# Patient Record
Sex: FEMALE | Race: WHITE | NOT HISPANIC OR LATINO | URBAN - METROPOLITAN AREA
[De-identification: names, ages, dates, MRNs, and addresses within clinical notes are randomized per-mention and may not be internally consistent; named-entity substitution may affect disease eponyms.]

---

## 2024-06-21 ENCOUNTER — OCCMED (OUTPATIENT)
Dept: URGENT CARE | Facility: CLINIC | Age: 39
End: 2024-06-21

## 2024-06-21 DIAGNOSIS — Z02.89 ENCOUNTER FOR OCCUPATIONAL PHYSICAL EXAMINATION: Primary | ICD-10-CM

## 2024-08-30 ENCOUNTER — APPOINTMENT (OUTPATIENT)
Dept: LAB | Facility: CLINIC | Age: 39
End: 2024-08-30
Payer: COMMERCIAL

## 2024-08-30 DIAGNOSIS — R53.1 WEAKNESS: ICD-10-CM

## 2024-08-30 DIAGNOSIS — R73.9 HYPERGLYCEMIA: ICD-10-CM

## 2024-08-30 DIAGNOSIS — E07.9 THYROID DISEASE: ICD-10-CM

## 2024-08-30 DIAGNOSIS — E78.5 HYPERLIPIDEMIA, UNSPECIFIED HYPERLIPIDEMIA TYPE: ICD-10-CM

## 2024-08-30 LAB
ALBUMIN SERPL BCG-MCNC: 4.6 G/DL (ref 3.5–5)
ALP SERPL-CCNC: 42 U/L (ref 34–104)
ALT SERPL W P-5'-P-CCNC: 10 U/L (ref 7–52)
ANION GAP SERPL CALCULATED.3IONS-SCNC: 6 MMOL/L (ref 4–13)
AST SERPL W P-5'-P-CCNC: 16 U/L (ref 13–39)
BASOPHILS # BLD AUTO: 0.07 THOUSANDS/ÂΜL (ref 0–0.1)
BASOPHILS NFR BLD AUTO: 1 % (ref 0–1)
BILIRUB SERPL-MCNC: 0.69 MG/DL (ref 0.2–1)
BUN SERPL-MCNC: 16 MG/DL (ref 5–25)
CALCIUM SERPL-MCNC: 9.2 MG/DL (ref 8.4–10.2)
CHLORIDE SERPL-SCNC: 104 MMOL/L (ref 96–108)
CHOLEST SERPL-MCNC: 171 MG/DL
CO2 SERPL-SCNC: 27 MMOL/L (ref 21–32)
CREAT SERPL-MCNC: 0.8 MG/DL (ref 0.6–1.3)
EOSINOPHIL # BLD AUTO: 0.06 THOUSAND/ÂΜL (ref 0–0.61)
EOSINOPHIL NFR BLD AUTO: 1 % (ref 0–6)
ERYTHROCYTE [DISTWIDTH] IN BLOOD BY AUTOMATED COUNT: 11.8 % (ref 11.6–15.1)
EST. AVERAGE GLUCOSE BLD GHB EST-MCNC: 103 MG/DL
GFR SERPL CREATININE-BSD FRML MDRD: 94 ML/MIN/1.73SQ M
GLUCOSE P FAST SERPL-MCNC: 88 MG/DL (ref 65–99)
HBA1C MFR BLD: 5.2 %
HCT VFR BLD AUTO: 40.9 % (ref 34.8–46.1)
HDLC SERPL-MCNC: 80 MG/DL
HGB BLD-MCNC: 13.7 G/DL (ref 11.5–15.4)
IMM GRANULOCYTES # BLD AUTO: 0.01 THOUSAND/UL (ref 0–0.2)
IMM GRANULOCYTES NFR BLD AUTO: 0 % (ref 0–2)
LDLC SERPL CALC-MCNC: 79 MG/DL (ref 0–100)
LYMPHOCYTES # BLD AUTO: 1.87 THOUSANDS/ÂΜL (ref 0.6–4.47)
LYMPHOCYTES NFR BLD AUTO: 36 % (ref 14–44)
MCH RBC QN AUTO: 30.3 PG (ref 26.8–34.3)
MCHC RBC AUTO-ENTMCNC: 33.5 G/DL (ref 31.4–37.4)
MCV RBC AUTO: 91 FL (ref 82–98)
MONOCYTES # BLD AUTO: 0.42 THOUSAND/ÂΜL (ref 0.17–1.22)
MONOCYTES NFR BLD AUTO: 8 % (ref 4–12)
NEUTROPHILS # BLD AUTO: 2.71 THOUSANDS/ÂΜL (ref 1.85–7.62)
NEUTS SEG NFR BLD AUTO: 54 % (ref 43–75)
NONHDLC SERPL-MCNC: 91 MG/DL
NRBC BLD AUTO-RTO: 0 /100 WBCS
PLATELET # BLD AUTO: 222 THOUSANDS/UL (ref 149–390)
PMV BLD AUTO: 10.1 FL (ref 8.9–12.7)
POTASSIUM SERPL-SCNC: 4.2 MMOL/L (ref 3.5–5.3)
PROT SERPL-MCNC: 6.9 G/DL (ref 6.4–8.4)
RBC # BLD AUTO: 4.52 MILLION/UL (ref 3.81–5.12)
SODIUM SERPL-SCNC: 137 MMOL/L (ref 135–147)
T4 FREE SERPL-MCNC: 0.77 NG/DL (ref 0.61–1.12)
TRIGL SERPL-MCNC: 58 MG/DL
TSH SERPL DL<=0.05 MIU/L-ACNC: 0.81 UIU/ML (ref 0.45–4.5)
WBC # BLD AUTO: 5.14 THOUSAND/UL (ref 4.31–10.16)

## 2024-08-30 PROCEDURE — 84439 ASSAY OF FREE THYROXINE: CPT

## 2024-08-30 PROCEDURE — 80061 LIPID PANEL: CPT

## 2024-08-30 PROCEDURE — 84443 ASSAY THYROID STIM HORMONE: CPT

## 2024-08-30 PROCEDURE — 85025 COMPLETE CBC W/AUTO DIFF WBC: CPT

## 2024-08-30 PROCEDURE — 83036 HEMOGLOBIN GLYCOSYLATED A1C: CPT

## 2024-08-30 PROCEDURE — 80053 COMPREHEN METABOLIC PANEL: CPT

## 2024-08-30 PROCEDURE — 36415 COLL VENOUS BLD VENIPUNCTURE: CPT

## 2025-02-17 ENCOUNTER — OFFICE VISIT (OUTPATIENT)
Dept: URGENT CARE | Facility: CLINIC | Age: 40
End: 2025-02-17
Payer: COMMERCIAL

## 2025-02-17 VITALS
SYSTOLIC BLOOD PRESSURE: 134 MMHG | WEIGHT: 139 LBS | RESPIRATION RATE: 18 BRPM | OXYGEN SATURATION: 97 % | DIASTOLIC BLOOD PRESSURE: 74 MMHG | HEART RATE: 114 BPM | TEMPERATURE: 101.5 F

## 2025-02-17 DIAGNOSIS — R50.9 FEVER, UNSPECIFIED FEVER CAUSE: ICD-10-CM

## 2025-02-17 DIAGNOSIS — J06.9 UPPER RESPIRATORY TRACT INFECTION, UNSPECIFIED TYPE: Primary | ICD-10-CM

## 2025-02-17 PROBLEM — K21.9 GASTROESOPHAGEAL REFLUX DISEASE: Status: ACTIVE | Noted: 2023-07-15

## 2025-02-17 PROBLEM — R51.9 NONINTRACTABLE HEADACHE: Status: ACTIVE | Noted: 2023-07-15

## 2025-02-17 PROBLEM — Z30.41 ORAL CONTRACEPTIVE USE: Status: ACTIVE | Noted: 2023-07-15

## 2025-02-17 PROBLEM — F41.1 GENERALIZED ANXIETY DISORDER: Status: ACTIVE | Noted: 2023-07-15

## 2025-02-17 PROBLEM — K14.3 COATED TONGUE: Status: ACTIVE | Noted: 2023-07-15

## 2025-02-17 PROCEDURE — 99213 OFFICE O/P EST LOW 20 MIN: CPT

## 2025-02-17 PROCEDURE — 87636 SARSCOV2 & INF A&B AMP PRB: CPT

## 2025-02-17 RX ORDER — IBUPROFEN 800 MG/1
800 TABLET, FILM COATED ORAL 3 TIMES DAILY
COMMUNITY
Start: 2024-12-08

## 2025-02-17 RX ORDER — ESCITALOPRAM OXALATE 10 MG/1
10 TABLET ORAL DAILY
COMMUNITY
Start: 2024-11-26

## 2025-02-17 RX ORDER — BENZONATATE 100 MG/1
100 CAPSULE ORAL 3 TIMES DAILY PRN
Qty: 20 CAPSULE | Refills: 0 | Status: SHIPPED | OUTPATIENT
Start: 2025-02-17

## 2025-02-17 RX ORDER — ACETAMINOPHEN 325 MG/1
650 TABLET ORAL ONCE
Status: COMPLETED | OUTPATIENT
Start: 2025-02-17 | End: 2025-02-17

## 2025-02-17 RX ORDER — ALBUTEROL SULFATE 90 UG/1
2 INHALANT RESPIRATORY (INHALATION) EVERY 6 HOURS PRN
Qty: 8.5 G | Refills: 0 | Status: SHIPPED | OUTPATIENT
Start: 2025-02-17

## 2025-02-17 RX ADMIN — ACETAMINOPHEN 650 MG: 325 TABLET ORAL at 16:21

## 2025-02-17 NOTE — PROGRESS NOTES
[facility]        NAME: Tory Romero is a 39 y.o. female  : 1985    MRN: [id number]  DATE: 2025  TIME: 4:23 PM    Assessment and Plan   Upper respiratory tract infection, unspecified type [J06.9]  1. Upper respiratory tract infection, unspecified type  benzonatate (TESSALON PERLES) 100 mg capsule    albuterol (ProAir HFA) 90 mcg/act inhaler      2. Fever, unspecified fever cause  acetaminophen (TYLENOL) tablet 650 mg        One-time dose of tylenol given in clinic for fever. Tessalon perles and albuterol as directed for additional symptom relief. COVID/flu PCR sent to lab, will f/u if positive. Suspect viral illness given clinical presentation. Patient out of window for Tamiflu. VSS in clinic, appears in no acute distress. Educated on use of OTC products for additional relief of symptoms. Advised close follow-up with PCP or to report to the ER if symptoms worsen. Patient verbalizes understanding and agreeable to plan.  Work note provided.      Patient Instructions     Symptoms of a viral infection may linger for up to 10 days. Your contagious period is the first 5-7 days of symptom onset. Take tessalon perles and use inhaler as directed as needed. Continue over-the-counter products for other symptoms: tylenol for fevers, ibuprofen for body aches, sudafed and flonase (fluticasone) with nasal saline for congestion, mucinex for cough, and airborne/emergen-c for vitamin supplementation. Follow-up with PCP in 3-5 days if no improvement of symptoms. Report to ER sooner if symptoms worsen.       Chief Complaint     Chief Complaint   Patient presents with    Cough     3 days of coughing, dry cough. Patient brings out a lot mucus when blowing her nose. She has been experiencing post nasal drip as well, which is beginning to irritate her throat. Past few days doing OTC medication, such as Day Quil, Ny Quil, also some tumeric pills. Patient goes into coughing spells and end up feeling SOB.           History of Present Illness       39 year old female presents for evaluation of cough, congestion, fever, and fatigue x 2-3 days. She denies any known sick contacts or triggers. She denies h/o asthma but reports chest tightness with deep breathing and coughing. She is not UTD on COVID or flu vaccines for this season. She denies productive sputum. She reports the cough is worse at night when lying down. She denies sore throat or ear pain. She has tried dayquil and nyquil with minimal symptom relief.    Cough  This is a new problem. The current episode started in the past 7 days. The problem has been unchanged. The problem occurs every few minutes. The cough is Non-productive. Associated symptoms include a fever, myalgias, nasal congestion, postnasal drip, rhinorrhea and a sore throat. Pertinent negatives include no chest pain, chills, ear congestion, ear pain, headaches, heartburn, hemoptysis, rash, shortness of breath, sweats, weight loss or wheezing. The symptoms are aggravated by lying down. She has tried OTC cough suppressant for the symptoms. The treatment provided mild relief. There is no history of asthma or environmental allergies.       Review of Systems   Review of Systems   Constitutional:  Positive for activity change, fatigue and fever. Negative for appetite change, chills and weight loss.   HENT:  Positive for congestion, postnasal drip, rhinorrhea and sore throat. Negative for ear discharge, ear pain, sinus pressure, sinus pain and sneezing.    Respiratory:  Positive for cough and chest tightness. Negative for hemoptysis, shortness of breath and wheezing.    Cardiovascular:  Negative for chest pain and palpitations.   Gastrointestinal:  Negative for abdominal pain, constipation, diarrhea, heartburn, nausea and vomiting.   Musculoskeletal:  Positive for myalgias. Negative for arthralgias, back pain and neck pain.   Skin:  Negative for color change, pallor and rash.   Allergic/Immunologic:  Negative for environmental allergies and food allergies.   Neurological:  Negative for dizziness, light-headedness and headaches.         Current Medications       Current Outpatient Medications:     albuterol (ProAir HFA) 90 mcg/act inhaler, Inhale 2 puffs every 6 (six) hours as needed for wheezing, Disp: 8.5 g, Rfl: 0    benzonatate (TESSALON PERLES) 100 mg capsule, Take 1 capsule (100 mg total) by mouth 3 (three) times a day as needed for cough, Disp: 20 capsule, Rfl: 0    escitalopram (LEXAPRO) 10 mg tablet, Take 10 mg by mouth daily, Disp: , Rfl:     ibuprofen (MOTRIN) 800 mg tablet, Take 800 mg by mouth 3 (three) times a day, Disp: , Rfl:   No current facility-administered medications for this visit.    Current Allergies     Allergies as of 02/17/2025    (No Known Allergies)            The following portions of the patient's history were reviewed and updated as appropriate: allergies, current medications, past family history, past medical history, past social history, past surgical history and problem list.     History reviewed. No pertinent past medical history.    History reviewed. No pertinent surgical history.    Family History   Problem Relation Age of Onset    No Known Problems Mother     No Known Problems Father          Medications have been verified.        Objective   /74 (Patient Position: Sitting, Cuff Size: Standard)   Pulse (!) 114   Temp (!) 101.5 °F (38.6 °C) (Oral)   Resp 18   Wt 63 kg (139 lb)   SpO2 97%        Physical Exam     Physical Exam  Vitals and nursing note reviewed.   Constitutional:       General: She is awake. She is not in acute distress.     Appearance: Normal appearance. She is well-developed and normal weight.   HENT:      Head: Normocephalic and atraumatic.      Right Ear: Hearing, tympanic membrane, ear canal and external ear normal.      Left Ear: Hearing, tympanic membrane, ear canal and external ear normal.      Nose: Congestion and rhinorrhea present.  Rhinorrhea is clear.      Right Turbinates: Not enlarged, swollen or pale.      Left Turbinates: Not enlarged, swollen or pale.      Right Sinus: No maxillary sinus tenderness or frontal sinus tenderness.      Left Sinus: No maxillary sinus tenderness or frontal sinus tenderness.      Mouth/Throat:      Lips: Pink.      Mouth: Mucous membranes are moist.      Pharynx: Oropharynx is clear. Uvula midline. Postnasal drip present. No oropharyngeal exudate or posterior oropharyngeal erythema.      Tonsils: No tonsillar exudate or tonsillar abscesses. 2+ on the right. 2+ on the left.   Eyes:      Conjunctiva/sclera: Conjunctivae normal.   Cardiovascular:      Rate and Rhythm: Normal rate and regular rhythm.      Pulses: Normal pulses.      Heart sounds: Normal heart sounds.   Pulmonary:      Effort: Pulmonary effort is normal.      Breath sounds: Normal breath sounds.   Musculoskeletal:      Cervical back: Full passive range of motion without pain, normal range of motion and neck supple.   Lymphadenopathy:      Cervical: No cervical adenopathy.   Skin:     General: Skin is warm and dry.   Neurological:      General: No focal deficit present.      Mental Status: She is alert and oriented to person, place, and time.   Psychiatric:         Mood and Affect: Mood normal.         Behavior: Behavior normal. Behavior is cooperative.         Thought Content: Thought content normal.         Judgment: Judgment normal.

## 2025-02-17 NOTE — PATIENT INSTRUCTIONS
Symptoms of a viral infection may linger for up to 10 days. Your contagious period is the first 5-7 days of symptom onset. Take tessalon perles and use inhaler as directed as needed. Continue over-the-counter products for other symptoms: tylenol for fevers, ibuprofen for body aches, sudafed and flonase (fluticasone) with nasal saline for congestion, mucinex for cough, and airborne/emergen-c for vitamin supplementation. Follow-up with PCP in 3-5 days if no improvement of symptoms. Report to ER sooner if symptoms worsen.     
with patient

## 2025-02-17 NOTE — LETTER
February 17, 2025     Patient: Tory Romero   YOB: 1985   Date of Visit: 2/17/2025       To Whom it May Concern:    Tory Romero was seen in my clinic on 2/17/2025. She may return to work on 2/19/2025 .    If you have any questions or concerns, please don't hesitate to call.         Sincerely,          DARREN Lubin        CC: No Recipients

## 2025-02-18 ENCOUNTER — RESULTS FOLLOW-UP (OUTPATIENT)
Dept: URGENT CARE | Facility: CLINIC | Age: 40
End: 2025-02-18

## 2025-02-18 LAB
FLUAV RNA RESP QL NAA+PROBE: POSITIVE
FLUBV RNA RESP QL NAA+PROBE: NEGATIVE
SARS-COV-2 RNA RESP QL NAA+PROBE: NEGATIVE

## 2025-06-18 ENCOUNTER — OCCMED (OUTPATIENT)
Dept: URGENT CARE | Facility: CLINIC | Age: 40
End: 2025-06-18

## 2025-06-18 DIAGNOSIS — Z02.4 ENCOUNTER FOR DEPARTMENT OF TRANSPORTATION (DOT) EXAMINATION FOR SCHOOL BUS LICENSE: Primary | ICD-10-CM

## 2025-07-14 ENCOUNTER — OFFICE VISIT (OUTPATIENT)
Dept: URGENT CARE | Facility: CLINIC | Age: 40
End: 2025-07-14
Payer: COMMERCIAL

## 2025-07-14 ENCOUNTER — APPOINTMENT (OUTPATIENT)
Dept: RADIOLOGY | Facility: CLINIC | Age: 40
End: 2025-07-14
Attending: PHYSICIAN ASSISTANT
Payer: COMMERCIAL

## 2025-07-14 VITALS
RESPIRATION RATE: 18 BRPM | HEART RATE: 72 BPM | WEIGHT: 139 LBS | DIASTOLIC BLOOD PRESSURE: 60 MMHG | OXYGEN SATURATION: 98 % | SYSTOLIC BLOOD PRESSURE: 112 MMHG | TEMPERATURE: 97.8 F
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
## 2025-07-14 DIAGNOSIS — S70.01XA CONTUSION OF RIGHT HIP AND THIGH, INITIAL ENCOUNTER: Primary | ICD-10-CM

## 2025-07-14 DIAGNOSIS — M25.551 RIGHT HIP PAIN: ICD-10-CM

## 2025-07-14 DIAGNOSIS — S70.11XA CONTUSION OF RIGHT HIP AND THIGH, INITIAL ENCOUNTER: Primary | ICD-10-CM

## 2025-07-14 PROCEDURE — 99213 OFFICE O/P EST LOW 20 MIN: CPT | Performed by: PHYSICIAN ASSISTANT

## 2025-07-14 PROCEDURE — 73502 X-RAY EXAM HIP UNI 2-3 VIEWS: CPT

## 2025-07-14 PROCEDURE — 73552 X-RAY EXAM OF FEMUR 2/>: CPT

## 2025-07-14 NOTE — PATIENT INSTRUCTIONS
Rest and elevate the affected painful area.  Apply cold compresses intermittently as needed.  As pain recedes, begin normal activities slowly as tolerated.        Follow up with PCP in 3-5 days.  Proceed to  ER if symptoms worsen.    If tests are performed, our office will contact you with results only if changes need to made to the care plan discussed with you at the visit. You can review your full results on [facility].

## 2025-07-14 NOTE — PROGRESS NOTES
Patient Name: Tory Romero      : 1985      MRN: [id number]  Encounter Provider: Vanesa Graf PA-C  Encounter Date: 2025  Encounter department: [facility]    Assessment & Plan  Contusion of right hip and thigh, initial encounter  For the right hip/pelvis as well as femur provider read-no obvious acute fracture or abnormality.    Recommended patient use ice and rest to help with pain and swelling.  She can use over-the-counter pain medication like Tylenol and ibuprofen as needed.       Right hip pain    Orders:    XR hip/pelv 2-3 vws right if performed; Future    XR femur 2 vw right; Future    Fall, initial encounter               Patient Instructions:   Patient Instructions    Rest and elevate the affected painful area.  Apply cold compresses intermittently as needed.  As pain recedes, begin normal activities slowly as tolerated.        Follow up with PCP in 3-5 days.  Proceed to  ER if symptoms worsen.    If tests are performed, our office will contact you with results only if changes need to made to the care plan discussed with you at the visit. You can review your full results on [facility].     Subjective   Chief Complaint   Patient presents with    Hip Pain     Pt here for fall and hit right hip area and buttocks that happened 30 mins ago          Tory Romero is a 40 y.o.female  Patient presents for evaluation of pain throughout the right side of her hip but down her leg not happened after she fell about 30 minutes ago.  She is able to walk and put weight on it but it is painful to do so and she has been walking with a limp.  She denies any numbness or tingling.        Review of Systems   Musculoskeletal:  Positive for arthralgias, gait problem, joint swelling and myalgias.   Skin:  Negative for color change, pallor, rash and wound.   All other systems reviewed and are negative.      Current Medications[1]  Allergies as of 2025    (No  Known Allergies)     Past Medical History[2]  Past Surgical History[3]  Family History[4]     Objective   /60   Pulse 72   Temp 97.8 °F (36.6 °C) (Tympanic)   Resp 18   Wt 63 kg (139 lb)   SpO2 98%      Physical Exam  Vitals and nursing note reviewed.   Constitutional:       Appearance: Normal appearance.     Musculoskeletal:      Lumbar back: Normal.      Right hip: Tenderness and bony tenderness (posterior hip) present. Decreased range of motion. Normal strength.      Right upper leg: Tenderness present.      Right knee: Normal.      Comments: Ambulates slowly with a slight limp.  Able to bear weight on right leg.     Skin:     General: Skin is warm and dry.     Neurological:      General: No focal deficit present.      Mental Status: She is alert and oriented to person, place, and time.     Psychiatric:         Mood and Affect: Mood normal.         Behavior: Behavior normal.            [1]   Current Outpatient Medications:     albuterol (ProAir HFA) 90 mcg/act inhaler, Inhale 2 puffs every 6 (six) hours as needed for wheezing, Disp: 8.5 g, Rfl: 0    escitalopram (LEXAPRO) 10 mg tablet, Take 10 mg by mouth in the morning., Disp: , Rfl:     benzonatate (TESSALON PERLES) 100 mg capsule, Take 1 capsule (100 mg total) by mouth 3 (three) times a day as needed for cough (Patient not taking: Reported on 7/14/2025), Disp: 20 capsule, Rfl: 0    ibuprofen (MOTRIN) 800 mg tablet, Take 800 mg by mouth 3 (three) times a day (Patient not taking: Reported on 7/14/2025), Disp: , Rfl:   [2] No past medical history on file.  [3] No past surgical history on file.  [4]   Family History  Problem Relation Name Age of Onset    No Known Problems Mother      No Known Problems Father